# Patient Record
Sex: FEMALE | Race: WHITE | NOT HISPANIC OR LATINO | Employment: FULL TIME | URBAN - METROPOLITAN AREA
[De-identification: names, ages, dates, MRNs, and addresses within clinical notes are randomized per-mention and may not be internally consistent; named-entity substitution may affect disease eponyms.]

---

## 2024-04-17 ENCOUNTER — HOSPITAL ENCOUNTER (OUTPATIENT)
Dept: RADIOLOGY | Facility: HOSPITAL | Age: 49
Discharge: HOME | End: 2024-04-17
Payer: COMMERCIAL

## 2024-04-17 DIAGNOSIS — R10.9 UNSPECIFIED ABDOMINAL PAIN: ICD-10-CM

## 2024-04-17 PROCEDURE — 76705 ECHO EXAM OF ABDOMEN: CPT

## 2024-04-17 PROCEDURE — 76705 ECHO EXAM OF ABDOMEN: CPT | Performed by: RADIOLOGY

## 2024-07-24 ENCOUNTER — HOSPITAL ENCOUNTER (OUTPATIENT)
Dept: RADIOLOGY | Facility: HOSPITAL | Age: 49
Discharge: HOME | End: 2024-07-24
Payer: COMMERCIAL

## 2024-07-24 DIAGNOSIS — M25.511 PAIN IN RIGHT SHOULDER: ICD-10-CM

## 2024-07-24 DIAGNOSIS — M48.02 SPINAL STENOSIS, CERVICAL REGION: ICD-10-CM

## 2024-07-24 DIAGNOSIS — M54.12 RADICULOPATHY, CERVICAL REGION: ICD-10-CM

## 2024-07-24 PROCEDURE — 73030 X-RAY EXAM OF SHOULDER: CPT | Mod: RIGHT SIDE | Performed by: RADIOLOGY

## 2024-07-24 PROCEDURE — 72050 X-RAY EXAM NECK SPINE 4/5VWS: CPT | Performed by: RADIOLOGY

## 2024-07-24 PROCEDURE — 73030 X-RAY EXAM OF SHOULDER: CPT | Mod: RT

## 2024-07-24 PROCEDURE — 72050 X-RAY EXAM NECK SPINE 4/5VWS: CPT

## 2024-08-01 ENCOUNTER — OFFICE VISIT (OUTPATIENT)
Dept: ORTHOPEDIC SURGERY | Facility: CLINIC | Age: 49
End: 2024-08-01
Payer: COMMERCIAL

## 2024-08-01 VITALS — WEIGHT: 177 LBS | HEIGHT: 65 IN | BODY MASS INDEX: 29.49 KG/M2

## 2024-08-01 DIAGNOSIS — M54.2 CERVICAL SPINE PAIN: ICD-10-CM

## 2024-08-01 DIAGNOSIS — M19.011 LOCALIZED OSTEOARTHRITIS OF RIGHT SHOULDER: Primary | ICD-10-CM

## 2024-08-01 DIAGNOSIS — M75.01 ADHESIVE CAPSULITIS OF RIGHT SHOULDER: ICD-10-CM

## 2024-08-01 PROCEDURE — 3008F BODY MASS INDEX DOCD: CPT | Performed by: ORTHOPAEDIC SURGERY

## 2024-08-01 PROCEDURE — 99203 OFFICE O/P NEW LOW 30 MIN: CPT | Performed by: ORTHOPAEDIC SURGERY

## 2024-08-01 RX ORDER — GLIMEPIRIDE 4 MG/1
4 TABLET ORAL
COMMUNITY
Start: 2024-06-01

## 2024-08-01 RX ORDER — LORAZEPAM 1 MG/1
1 TABLET ORAL
COMMUNITY
Start: 2024-03-16

## 2024-08-01 RX ORDER — FLUCONAZOLE 150 MG/1
150 TABLET ORAL ONCE
COMMUNITY
Start: 2024-04-12

## 2024-08-01 RX ORDER — ESOMEPRAZOLE MAGNESIUM 40 MG/1
40 CAPSULE, DELAYED RELEASE ORAL
COMMUNITY
Start: 2024-06-27

## 2024-08-01 NOTE — PROGRESS NOTES
History of Present Illness:   Patient presents today with right shoulder pain.  The  patient notes the insidious onset of pain and denies recent or historical trauma.  There is persistent pain and difficulty with motion.  The pain is diffuse, achy, worse at the extremes of motion.  There is pain at night.  She has tried so far rest, ice, anti-inflammatories as well as multiple Medrol Dosepaks.    She notices that she is beginning to lose motion and strength in the right upper extremity.  She endorses some symptoms of regarding cervical radiculopathy including paresthesias in the right upper extremity radiating from the neck into the scapula as well as down the lateral aspect of the arm to the elbow.  She notes that she will lose  strength because of these paresthesias as well.  Denies any obvious pattern of distribution to the digits.    Currently works as a  in [employer], very physically active, right-hand-dominant.  She is diabetic with blood glucose levels ranging about 200 on a regular basis, she takes several medications for this.      No past medical history on file.  No past surgical history on file.  No current outpatient medications on file.    Review of Systems   GENERAL: Negative for malaise, significant weight loss, fever  MUSCULOSKELETAL: see HPI  NEURO:  Negative    Physical Examination:  Skin healthy and intact  Negative Spurlings test  Range of motion:  Forward flexion: 120  External rotation: 20  Internal rotation: Lateral beltline, contralateral side to upper T-spine  No gross weakness with strength testing regarding external and internal rotation however there is some mild weakness to resisted abduction.    Positive Spurling's test  The remainder of the distal neurovascular exam is intact    Imaging:   Plain films of the right shoulder today reveal minimal degenerative changes to the AC joint, no degenerative change of the glenohumeral joint.  No obvious fracture dislocation  or other bony abnormality.  Plain films of the cervical spine reveal some degenerative changes noted predominate on the lateral films to C5/6/7      Assessment:  Patient with adhesive capsulitis , cervical radiculopathy with degenerative changes to C5/6/7    Plan:  We had a lengthy discussion regarding adhesive capsulitis including the causes (idiopathic vs secondary in nature).  We stressed the importance of extensive physical therapy and strict adherence to a home exercise program.  We highlighted the excellent probability for success with non-surgical treatment but the protracted course of recovery.   We reviewed the role of anti-inflammatories (oral and injectable) and the potential for manipulation and/or arthroscopy if failure to improve.    We strongly recommend physical therapy to loosen up the shoulder from adhesive capsulitis as well as physical therapy exercises specifically for the neck and cervical radicular symptoms.  Ultimately we discussed that her diabetes could have been the cause of the frozen shoulder as this is been documented in the literature as a risk factor, however described the overall disease process of adhesive capsulitis.  Discussed that we could utilize corticosteroid injections if her sugars were significantly lower and reviewed proper control with these medications.  We also encouraged that she begin physical therapy for the C-spine to help with her cervical radicular symptoms and ultimately would defer to spine for further treatment.  Discussed routine follow-up to check motion as well as possibility of manipulation under anesthesia if she fails to improve.    Fredo Velasquez PA-C      In a face to face encounter, I evaluated the patient and performed a physical examination, discussed pertinent diagnostic studies if indicated and discussed diagnosis and management strategies with both the patient and physician assistant / nurse practitioner.  I reviewed the PA/NP's note and agree  with the documented findings and plan of care.    Patient with adhesive capsulitis.  As well as some mild cervical irritation.  Discussed the importance of strict glycemic control, formal physical therapy.  Follow-up in 2 months for repeat evaluation    Kurt Slade MD

## 2024-10-03 ENCOUNTER — APPOINTMENT (OUTPATIENT)
Dept: ORTHOPEDIC SURGERY | Facility: CLINIC | Age: 49
End: 2024-10-03
Payer: COMMERCIAL

## 2024-10-03 DIAGNOSIS — M75.01 ADHESIVE CAPSULITIS OF RIGHT SHOULDER: Primary | ICD-10-CM

## 2024-10-03 PROCEDURE — 20610 DRAIN/INJ JOINT/BURSA W/O US: CPT | Performed by: ORTHOPAEDIC SURGERY

## 2024-10-03 RX ORDER — LIDOCAINE HYDROCHLORIDE 10 MG/ML
2 INJECTION, SOLUTION INFILTRATION; PERINEURAL
Status: COMPLETED | OUTPATIENT
Start: 2024-10-03 | End: 2024-10-03

## 2024-10-03 RX ORDER — TRIAMCINOLONE ACETONIDE 40 MG/ML
40 INJECTION, SUSPENSION INTRA-ARTICULAR; INTRAMUSCULAR
Status: COMPLETED | OUTPATIENT
Start: 2024-10-03 | End: 2024-10-03

## 2024-10-03 ASSESSMENT — PAIN SCALES - GENERAL: PAINLEVEL_OUTOF10: 2

## 2024-10-03 ASSESSMENT — PAIN - FUNCTIONAL ASSESSMENT: PAIN_FUNCTIONAL_ASSESSMENT: 0-10

## 2024-10-03 NOTE — PROGRESS NOTES
History of Present Illness:   Patient returns today with right shoulder pain and known adhesive capsulitis.  There is persistent pain and difficulty with motion.  The pain is diffuse, achy, worse at the extremes of motion.  There is pain at night.  She does feel like she is improving.     She did have a recent injury where she tripped and fell catching herself on the right shoulder.     Review of Systems   GENERAL: Negative for malaise, significant weight loss, fever  MUSCULOSKELETAL: see HPI  NEURO:  Negative    Physical Exam:  Skin healthy and intact  Range of motion:  Forward flexion:  full  External rotation: 35 with soft endpoint  Internal rotation:  to lumbar spine  No gross weakness with strength testing   (+) Obriens test   Normal neurovascular exam distally     Assessment:  Patient with right shoulder adhesive capsulitis , underlying cervical issues     Plan:  The disease process was reviewed again and we discussed anti-inflammatories (NSAIDS and injections) and the importance of physical therapy and strict adherence to home exercise program.  The role of injections and manipulation / lysis of adhesions of adhesions were discussed.   We recommenced CSI today for adhesive capsulitis with increased PT. Possible MRI if she fails to improve given recent injury.     Fredo Velasquez PA-C     In a face to face encounter, I evaluated the patient and performed a physical examination, discussed pertinent diagnostic studies if indicated and discussed diagnosis and management strategies with both the patient and physician assistant / nurse practitioner.  I reviewed the PA/NP's note and agree with the documented findings and plan of care.    Patient doing well with therapy significant improvement in range of motion.  Still has some pain we discussed corticosteroid injection monitoring blood sugar closely.  We reviewed that if the pain persist recommend MRI for further evaluation    L Inj/Asp: R subacromial bursa on  10/3/2024 8:28 AM  Indications: pain  Details: 22 G needle, posterior approach  Medications: 2 mL lidocaine 10 mg/mL (1 %); 40 mg triamcinolone acetonide 40 mg/mL  Outcome: tolerated well, no immediate complications  Procedure, treatment alternatives, risks and benefits explained, specific risks discussed. Consent was given by the patient. Immediately prior to procedure a time out was called to verify the correct patient, procedure, equipment, support staff and site/side marked as required. Patient was prepped and draped in the usual sterile fashion.             Kurt Slade MD

## 2025-07-15 ENCOUNTER — HOSPITAL ENCOUNTER (OUTPATIENT)
Dept: RADIOLOGY | Facility: HOSPITAL | Age: 50
Discharge: HOME | End: 2025-07-15
Payer: COMMERCIAL

## 2025-07-15 DIAGNOSIS — R10.9 UNSPECIFIED ABDOMINAL PAIN: ICD-10-CM

## 2025-07-15 DIAGNOSIS — R94.5 ABNORMAL RESULTS OF LIVER FUNCTION STUDIES: ICD-10-CM

## 2025-07-15 PROCEDURE — 74018 RADEX ABDOMEN 1 VIEW: CPT | Performed by: RADIOLOGY

## 2025-07-15 PROCEDURE — 76705 ECHO EXAM OF ABDOMEN: CPT

## 2025-07-15 PROCEDURE — 76705 ECHO EXAM OF ABDOMEN: CPT | Performed by: STUDENT IN AN ORGANIZED HEALTH CARE EDUCATION/TRAINING PROGRAM

## 2025-07-15 PROCEDURE — 74018 RADEX ABDOMEN 1 VIEW: CPT

## 2025-07-25 ENCOUNTER — HOSPITAL ENCOUNTER (OUTPATIENT)
Dept: RADIOLOGY | Facility: HOSPITAL | Age: 50
Discharge: HOME | End: 2025-07-25
Payer: COMMERCIAL

## 2025-07-25 DIAGNOSIS — K81.9 CHOLECYSTITIS, UNSPECIFIED: ICD-10-CM

## 2025-07-25 DIAGNOSIS — R10.9 UNSPECIFIED ABDOMINAL PAIN: ICD-10-CM

## 2025-07-25 PROCEDURE — 3430000001 HC RX 343 DIAGNOSTIC RADIOPHARMACEUTICALS: Performed by: INTERNAL MEDICINE

## 2025-07-25 PROCEDURE — 2500000004 HC RX 250 GENERAL PHARMACY W/ HCPCS (ALT 636 FOR OP/ED): Performed by: INTERNAL MEDICINE

## 2025-07-25 PROCEDURE — A9537 TC99M MEBROFENIN: HCPCS | Performed by: INTERNAL MEDICINE

## 2025-07-25 PROCEDURE — 78226 HEPATOBILIARY SYSTEM IMAGING: CPT

## 2025-07-25 RX ORDER — SINCALIDE 5 UG/5ML
1.4 INJECTION, POWDER, LYOPHILIZED, FOR SOLUTION INTRAVENOUS ONCE
Status: COMPLETED | OUTPATIENT
Start: 2025-07-25 | End: 2025-07-25

## 2025-07-25 RX ORDER — SINCALIDE 5 UG/5ML
1.4 INJECTION, POWDER, LYOPHILIZED, FOR SOLUTION INTRAVENOUS ONCE
Status: DISCONTINUED | OUTPATIENT
Start: 2025-07-25 | End: 2025-07-26 | Stop reason: HOSPADM

## 2025-07-25 RX ORDER — KIT FOR THE PREPARATION OF TECHNETIUM TC 99M MEBROFENIN 45 MG/10ML
4.8 INJECTION, POWDER, LYOPHILIZED, FOR SOLUTION INTRAVENOUS
Status: COMPLETED | OUTPATIENT
Start: 2025-07-25 | End: 2025-07-25

## 2025-07-25 RX ADMIN — SINCALIDE 1.4 MCG: 5 INJECTION, POWDER, LYOPHILIZED, FOR SOLUTION INTRAVENOUS at 08:50

## 2025-07-25 RX ADMIN — KIT FOR THE PREPARATION OF TECHNETIUM TC 99M MEBROFENIN 4.8 MILLICURIE: 45 INJECTION, POWDER, LYOPHILIZED, FOR SOLUTION INTRAVENOUS at 07:35
